# Patient Record
Sex: MALE | Race: WHITE | Employment: FULL TIME | URBAN - METROPOLITAN AREA
[De-identification: names, ages, dates, MRNs, and addresses within clinical notes are randomized per-mention and may not be internally consistent; named-entity substitution may affect disease eponyms.]

---

## 2019-04-26 ENCOUNTER — OFFICE VISIT (OUTPATIENT)
Dept: FAMILY MEDICINE CLINIC | Age: 34
End: 2019-04-26
Payer: COMMERCIAL

## 2019-04-26 ENCOUNTER — HOSPITAL ENCOUNTER (OUTPATIENT)
Age: 34
Setting detail: SPECIMEN
Discharge: HOME | End: 2019-04-26
Payer: COMMERCIAL

## 2019-04-26 VITALS
SYSTOLIC BLOOD PRESSURE: 120 MMHG | BODY MASS INDEX: 26.68 KG/M2 | WEIGHT: 197 LBS | HEART RATE: 72 BPM | DIASTOLIC BLOOD PRESSURE: 76 MMHG | TEMPERATURE: 98.5 F | HEIGHT: 72 IN | RESPIRATION RATE: 14 BRPM

## 2019-04-26 DIAGNOSIS — Z13.6 SCREENING FOR CARDIOVASCULAR CONDITION: ICD-10-CM

## 2019-04-26 DIAGNOSIS — Z13.220 SCREENING FOR LIPID DISORDERS: ICD-10-CM

## 2019-04-26 DIAGNOSIS — Z76.89 ENCOUNTER TO ESTABLISH CARE: ICD-10-CM

## 2019-04-26 DIAGNOSIS — Z00.00 PHYSICAL EXAM, ANNUAL: Primary | ICD-10-CM

## 2019-04-26 DIAGNOSIS — G89.29 CHRONIC PAIN OF RIGHT THUMB: ICD-10-CM

## 2019-04-26 DIAGNOSIS — M79.644 CHRONIC PAIN OF RIGHT THUMB: ICD-10-CM

## 2019-04-26 DIAGNOSIS — Z00.00 PHYSICAL EXAM, ANNUAL: ICD-10-CM

## 2019-04-26 DIAGNOSIS — R20.0 THUMB NUMBNESS: ICD-10-CM

## 2019-04-26 LAB
ALBUMIN SERPL-MCNC: 4.6 G/DL (ref 3.5–5.2)
ALBUMIN/GLOBULIN RATIO: 1.7 (ref 1–2.5)
ALP BLD-CCNC: 67 U/L (ref 40–129)
ALT SERPL-CCNC: 39 U/L (ref 5–41)
ANION GAP SERPL CALCULATED.3IONS-SCNC: 18 MMOL/L (ref 9–17)
AST SERPL-CCNC: 40 U/L
BILIRUB SERPL-MCNC: <0.1 MG/DL (ref 0.3–1.2)
BUN BLDV-MCNC: 19 MG/DL (ref 6–20)
BUN/CREAT BLD: ABNORMAL (ref 9–20)
CALCIUM SERPL-MCNC: 9.9 MG/DL (ref 8.6–10.4)
CHLORIDE BLD-SCNC: 108 MMOL/L (ref 98–107)
CHOLESTEROL/HDL RATIO: 3.7
CHOLESTEROL: 209 MG/DL
CO2: 21 MMOL/L (ref 20–31)
CREAT SERPL-MCNC: 0.97 MG/DL (ref 0.7–1.2)
GFR AFRICAN AMERICAN: >60 ML/MIN
GFR NON-AFRICAN AMERICAN: >60 ML/MIN
GFR SERPL CREATININE-BSD FRML MDRD: ABNORMAL ML/MIN/{1.73_M2}
GFR SERPL CREATININE-BSD FRML MDRD: ABNORMAL ML/MIN/{1.73_M2}
GLUCOSE BLD-MCNC: 71 MG/DL (ref 70–99)
HCT VFR BLD CALC: 47.3 % (ref 40.7–50.3)
HDLC SERPL-MCNC: 57 MG/DL
HEMOGLOBIN: 15.1 G/DL (ref 13–17)
LDL CHOLESTEROL: 121 MG/DL (ref 0–130)
MCH RBC QN AUTO: 30.6 PG (ref 25.2–33.5)
MCHC RBC AUTO-ENTMCNC: 31.9 G/DL (ref 28.4–34.8)
MCV RBC AUTO: 95.9 FL (ref 82.6–102.9)
NRBC AUTOMATED: 0 PER 100 WBC
PDW BLD-RTO: 12.5 % (ref 11.8–14.4)
PLATELET # BLD: 217 K/UL (ref 138–453)
PMV BLD AUTO: 10.9 FL (ref 8.1–13.5)
POTASSIUM SERPL-SCNC: 4.8 MMOL/L (ref 3.7–5.3)
RBC # BLD: 4.93 M/UL (ref 4.21–5.77)
SODIUM BLD-SCNC: 147 MMOL/L (ref 135–144)
TOTAL PROTEIN: 7.3 G/DL (ref 6.4–8.3)
TRIGL SERPL-MCNC: 155 MG/DL
VLDLC SERPL CALC-MCNC: ABNORMAL MG/DL (ref 1–30)
WBC # BLD: 6.4 K/UL (ref 3.5–11.3)

## 2019-04-26 PROCEDURE — 99385 PREV VISIT NEW AGE 18-39: CPT | Performed by: NURSE PRACTITIONER

## 2019-04-26 SDOH — HEALTH STABILITY: MENTAL HEALTH: HOW OFTEN DO YOU HAVE A DRINK CONTAINING ALCOHOL?: 2-3 TIMES A WEEK

## 2019-04-26 SDOH — HEALTH STABILITY: MENTAL HEALTH: HOW MANY STANDARD DRINKS CONTAINING ALCOHOL DO YOU HAVE ON A TYPICAL DAY?: 1 OR 2

## 2019-04-26 ASSESSMENT — ENCOUNTER SYMPTOMS
VOMITING: 0
RHINORRHEA: 0
BLOOD IN STOOL: 0
WHEEZING: 0
EYE REDNESS: 0
SORE THROAT: 0
EYE DISCHARGE: 0
DIARRHEA: 0
SHORTNESS OF BREATH: 0
BACK PAIN: 0
COUGH: 0
ABDOMINAL DISTENTION: 0
NAUSEA: 0
CHEST TIGHTNESS: 0
ABDOMINAL PAIN: 0
EYE PAIN: 0
SINUS PRESSURE: 0

## 2019-04-26 ASSESSMENT — PATIENT HEALTH QUESTIONNAIRE - PHQ9
SUM OF ALL RESPONSES TO PHQ9 QUESTIONS 1 & 2: 0
2. FEELING DOWN, DEPRESSED OR HOPELESS: 0
1. LITTLE INTEREST OR PLEASURE IN DOING THINGS: 0
SUM OF ALL RESPONSES TO PHQ QUESTIONS 1-9: 0
SUM OF ALL RESPONSES TO PHQ QUESTIONS 1-9: 0

## 2019-04-26 NOTE — PROGRESS NOTES
Subjective:      Patient ID: Sarah Barnes is a 27 y.o. male. Visit Information    Have you changed or started any medications since your last visit including any over-the-counter medicines, vitamins, or herbal medicines? no   Are you having any side effects from any of your medications? -  no  Have you stopped taking any of your medications? Is so, why? -  no    Have you seen any other physician or provider since your last visit? No  Have you had any other diagnostic tests since your last visit? No  Have you been seen in the emergency room and/or had an admission to a hospital since we last saw you? No  Have you had your routine dental cleaning in the past 6 months? yes - routine    Have you activated your [employer] account? If not, what are your barriers? Yes     Patient Care Team:  WILTON Monahan - CNP as PCP - General (Certified Nurse Practitioner)    Medical History Review  Past Medical, Family, and Social History reviewed and does contribute to the patient presenting condition    Health Maintenance   Topic Date Due    Pneumococcal 0-64 years Vaccine (1 of 1 - PPSV23) 02/16/1990    Varicella Vaccine (1 of 2 - 13+ 2-dose series) 02/16/1997    DTaP/Tdap/Td vaccine (1 - Tdap) 02/16/2003    HIV screen  04/26/2020 (Originally 2/16/1999)    Flu vaccine (Season Ended) 09/01/2019       PHQ Scores 4/26/2019   PHQ2 Score 0   PHQ9 Score 0     Interpretation of Total Score DepressionSeverity: 1-4 = Minimal depression, 5-9 = Mild depression, 10-14 = Moderate depression, 15-19 = Moderately severe depression, 20-27 = Severe depression    No current outpatient medications on file. No current facility-administered medications for this visit. Hand Pain    The incident occurred more than 1 week ago. There was no injury mechanism. Pain location: right thumb. The quality of the pain is described as stabbing. The pain does not radiate.  Pain scale: up to 8/10 with reaching for things - goes away when he stops. The patient is experiencing no pain. The pain has been intermittent since the incident. Associated symptoms include numbness. Pertinent negatives include no chest pain. Exacerbated by: reaching. He has tried nothing for the symptoms. Review of Systems   Constitutional: Negative for activity change, appetite change, fatigue and fever. HENT: Negative for congestion, ear discharge, ear pain, postnasal drip, rhinorrhea, sinus pressure and sore throat. Eyes: Negative for pain, discharge, redness and visual disturbance. Respiratory: Negative for cough, chest tightness, shortness of breath and wheezing. Cardiovascular: Negative for chest pain, palpitations and leg swelling. Gastrointestinal: Negative for abdominal distention, abdominal pain, blood in stool, diarrhea, nausea and vomiting. Umbilical hernia for last 2 years   Endocrine: Negative for polydipsia, polyphagia and polyuria. Genitourinary: Negative for decreased urine volume, dysuria, flank pain and hematuria. Musculoskeletal: Negative for arthralgias, back pain, joint swelling, myalgias and neck stiffness. Right thumb pain for last 4-6 weeks   Skin: Negative for rash. Neurological: Positive for numbness. Negative for dizziness, syncope, weakness, light-headedness and headaches. Hematological: Negative for adenopathy. Psychiatric/Behavioral: Negative for agitation, decreased concentration, self-injury, sleep disturbance and suicidal ideas. The patient is not nervous/anxious and is not hyperactive. Objective:     /76 (Site: Left Upper Arm, Position: Sitting, Cuff Size: Medium Adult)   Pulse 72   Temp 98.5 °F (36.9 °C) (Tympanic)   Resp 14   Ht 6' (1.829 m)   Wt 197 lb (89.4 kg)   BMI 26.72 kg/m²      Physical Exam   Constitutional: He is oriented to person, place, and time. Vital signs are normal. He appears well-developed and well-nourished. He is cooperative. No distress.    HENT:   Head: Atraumatic. Right Ear: Tympanic membrane, external ear and ear canal normal.   Left Ear: Tympanic membrane, external ear and ear canal normal.   Nose: Nose normal.   Mouth/Throat: Uvula is midline, oropharynx is clear and moist and mucous membranes are normal.   Eyes: Pupils are equal, round, and reactive to light. Conjunctivae and lids are normal. Right eye exhibits no discharge. Left eye exhibits no discharge. Neck: Trachea normal and normal range of motion. Neck supple. Cardiovascular: Normal rate, regular rhythm, S1 normal, S2 normal and normal heart sounds. No murmur heard. Pulses:       Radial pulses are 2+ on the right side, and 2+ on the left side. Posterior tibial pulses are 2+ on the right side, and 2+ on the left side. Pulmonary/Chest: Effort normal and breath sounds normal. No respiratory distress. He has no wheezes. He has no rhonchi. Abdominal: Soft. Bowel sounds are normal. He exhibits no distension. There is no hepatosplenomegaly. There is no tenderness. There is no rebound and no CVA tenderness. Musculoskeletal: Normal range of motion. He exhibits no edema or tenderness. Right hand: He exhibits normal range of motion, normal capillary refill and no deformity. Normal sensation noted. Normal strength noted. Hands:  Lymphadenopathy:     He has no cervical adenopathy. Neurological: He is alert and oriented to person, place, and time. He has normal strength. He exhibits normal muscle tone. Coordination normal.   Skin: Skin is warm, dry and intact. No rash noted. No erythema. Psychiatric: He has a normal mood and affect. His speech is normal and behavior is normal. Thought content normal.   Nursing note and vitals reviewed. Assessment:      Diagnosis Orders   1. Physical exam, annual  Lipid Panel    Comprehensive Metabolic Panel    CBC   2. Encounter to establish care     3. Screening for lipid disorders  Lipid Panel    Comprehensive Metabolic Panel    CBC   4. Screening for cardiovascular condition  Lipid Panel    Comprehensive Metabolic Panel    CBC   5. Chronic pain of right thumb  External Referral To Hand Surgery   6. Thumb numbness  External Referral To Hand Surgery       Plan:     Care established in office   Proceed with fasting today labs as ordered  Recommend healthy diet and regular exercise  Referral to hand surgeon   Recommend monthly self testicular exams and skin check  Recommend using sunscreen when outside, yearly eye exams, twice yearly dental exams, wear seatbelt when in car, and helmet when biking   Call with concerns  Return in about 1 year (around 4/26/2020) for physical.    Blaze Pope received counseling on the following healthy behaviors: nutrition, exercise, medication adherence and tobacco cessation  Reviewed prior labs and health maintenance  Continue current medications, diet and exercise. Discussed use, benefit, and side effects of prescribed medications. Barriers to medication compliance addressed. Patient given educational materials - see patient instructions  Was a self-tracking handout given in paper form or via [employer]? No    Requested Prescriptions      No prescriptions requested or ordered in this encounter       All patient questions answered. Patient voiced understanding. Quality Measures    Body mass index is 26.72 kg/m². Elevated. Weight control planned discussed Healthy diet and regular exercise. BP: 120/76 Blood pressure is normal. Treatment plan consists of No treatment change needed.     Lab Results   Component Value Date    LDLCHOLESTEROL 121 04/26/2019    (goal LDL reduction with dx if diabetes is 50% LDL reduction)      PHQ Scores 4/26/2019   PHQ2 Score 0   PHQ9 Score 0     Interpretation of Total Score Depression Severity: 1-4 = Minimal depression, 5-9 = Mild depression, 10-14 = Moderate depression, 15-19 = Moderately severe depression, 20-27 = Severe depression            Electronically signed by Don Silva

## 2019-04-26 NOTE — PATIENT INSTRUCTIONS
condoms. For men  · Tests for sexually transmitted infections (STIs). Ask whether you should have tests for STIs. You may be at risk if you have sex with more than one person, especially if you do not wear a condom. · Testicular cancer exam. Ask your doctor whether you should check your testicles regularly. · Prostate exam. Talk to your doctor about whether you should have a blood test (called a PSA test) for prostate cancer. Experts differ on whether and when men should have this test. Some experts suggest it if you are older than 39 and are -American or have a father or brother who got prostate cancer when he was younger than 72. When should you call for help? Watch closely for changes in your health, and be sure to contact your doctor if you have any problems or symptoms that concern you. Where can you learn more? Go to https://BollingoBlogpeyennieb.[email or web address].com. org and sign in to your [employer] account. Enter P072 in the Reebee box to learn more about \"Well Visit, Ages 25 to 48: Care Instructions. \"     If you do not have an account, please click on the \"Sign Up Now\" link. Current as of: March 28, 2018  Content Version: 11.9  © 3053-7520 [employer]. Care instructions adapted under license by [facility] ([facility]). If you have questions about a medical condition or this instruction, always ask your healthcare professional. [address]  any warranty or liability for your use of this information.

## 2020-06-10 ENCOUNTER — OFFICE VISIT (OUTPATIENT)
Dept: FAMILY MEDICINE CLINIC | Age: 35
End: 2020-06-10
Payer: COMMERCIAL

## 2020-06-10 VITALS
OXYGEN SATURATION: 97 % | HEART RATE: 69 BPM | TEMPERATURE: 96.4 F | DIASTOLIC BLOOD PRESSURE: 68 MMHG | BODY MASS INDEX: 26.95 KG/M2 | SYSTOLIC BLOOD PRESSURE: 110 MMHG | HEIGHT: 72 IN | WEIGHT: 199 LBS | RESPIRATION RATE: 14 BRPM

## 2020-06-10 PROCEDURE — 99213 OFFICE O/P EST LOW 20 MIN: CPT | Performed by: NURSE PRACTITIONER

## 2020-06-10 SDOH — ECONOMIC STABILITY: TRANSPORTATION INSECURITY
IN THE PAST 12 MONTHS, HAS LACK OF TRANSPORTATION KEPT YOU FROM MEETINGS, WORK, OR FROM GETTING THINGS NEEDED FOR DAILY LIVING?: NO

## 2020-06-10 SDOH — ECONOMIC STABILITY: INCOME INSECURITY: HOW HARD IS IT FOR YOU TO PAY FOR THE VERY BASICS LIKE FOOD, HOUSING, MEDICAL CARE, AND HEATING?: NOT HARD AT ALL

## 2020-06-10 SDOH — ECONOMIC STABILITY: TRANSPORTATION INSECURITY
IN THE PAST 12 MONTHS, HAS THE LACK OF TRANSPORTATION KEPT YOU FROM MEDICAL APPOINTMENTS OR FROM GETTING MEDICATIONS?: NO

## 2020-06-10 SDOH — ECONOMIC STABILITY: FOOD INSECURITY: WITHIN THE PAST 12 MONTHS, THE FOOD YOU BOUGHT JUST DIDN'T LAST AND YOU DIDN'T HAVE MONEY TO GET MORE.: NEVER TRUE

## 2020-06-10 SDOH — ECONOMIC STABILITY: FOOD INSECURITY: WITHIN THE PAST 12 MONTHS, YOU WORRIED THAT YOUR FOOD WOULD RUN OUT BEFORE YOU GOT MONEY TO BUY MORE.: NEVER TRUE

## 2020-06-10 ASSESSMENT — PATIENT HEALTH QUESTIONNAIRE - PHQ9
SUM OF ALL RESPONSES TO PHQ QUESTIONS 1-9: 0
1. LITTLE INTEREST OR PLEASURE IN DOING THINGS: 0
SUM OF ALL RESPONSES TO PHQ9 QUESTIONS 1 & 2: 0
SUM OF ALL RESPONSES TO PHQ QUESTIONS 1-9: 0
2. FEELING DOWN, DEPRESSED OR HOPELESS: 0

## 2020-06-10 ASSESSMENT — ENCOUNTER SYMPTOMS
DIARRHEA: 0
SORE THROAT: 0
SHORTNESS OF BREATH: 0
CONSTIPATION: 0
COUGH: 0
RHINORRHEA: 0

## 2020-06-17 ENCOUNTER — HOSPITAL ENCOUNTER (OUTPATIENT)
Dept: ULTRASOUND IMAGING | Facility: CLINIC | Age: 35
Discharge: HOME | End: 2020-06-19
Payer: COMMERCIAL

## 2020-06-17 PROCEDURE — 76705 ECHO EXAM OF ABDOMEN: CPT

## 2020-07-22 ENCOUNTER — HOSPITAL ENCOUNTER (OUTPATIENT)
Dept: SURGERY | Age: 35
Discharge: HOME | End: 2020-07-22
Payer: COMMERCIAL

## 2020-07-22 VITALS
SYSTOLIC BLOOD PRESSURE: 106 MMHG | HEART RATE: 88 BPM | HEIGHT: 72 IN | DIASTOLIC BLOOD PRESSURE: 71 MMHG | OXYGEN SATURATION: 97 % | WEIGHT: 207 LBS | BODY MASS INDEX: 28.04 KG/M2

## 2020-07-22 PROBLEM — K42.9 UMBILICAL HERNIA WITHOUT OBSTRUCTION AND WITHOUT GANGRENE: Status: ACTIVE | Noted: 2020-07-22

## 2020-07-22 PROCEDURE — 99201 HC NEW PT, OUTPT VISIT LEVEL 1: CPT

## 2020-07-22 PROCEDURE — 99203 OFFICE O/P NEW LOW 30 MIN: CPT | Performed by: SURGERY

## 2020-07-22 ASSESSMENT — ENCOUNTER SYMPTOMS
BACK PAIN: 0
SORE THROAT: 0
NAUSEA: 0
WHEEZING: 0
ABDOMINAL DISTENTION: 0
COUGH: 0
RHINORRHEA: 0
ABDOMINAL PAIN: 1
SHORTNESS OF BREATH: 0
CONSTIPATION: 0
DIARRHEA: 0

## 2020-07-22 NOTE — PROGRESS NOTES
rhonchi, or wheezes. CARDIO: S1 and S2 are within normal limits, regular rate and rhythm, no murmurs, no jugular venous distention and no ankle edema. ABDOMEN: the abdomen is soft without organomegaly, masses or tenderness. There are no abdominal scars. Within the supraumbilical skin fold and umbilical hernia is obvious on both supine and upright exam with an approximately 2 to 3 cm hernia bulge. When he is supine this is mostly reducible and is slightly tender when reduced. The underlying fascial defect does appear to only be about 1 cm or so in size. No other midline hernias can be appreciated even with upright examination and provocative maneuvers. INGUINAL: With inguinal areas are examined upright and with provocative maneuvers and no evidence of inguinal hernia can be appreciated on either side. GENITOURINARY: Normal circumcised male. No testicular masses or tenderness are present. RECTAL: ANA not preformed  EXTREMITIES: no deformity or edema noted. Imaging: The outpatient ultrasound performed by Jackson Contreras was reviewed, both report and images and this is summarized as above. ASSESSMENT:     Assessment:  Active Hospital Problems    Diagnosis Date Noted    Umbilical hernia without obstruction and without gangrene [K42.9] 07/22/2020     1. Umbilical hernia. This has enlarged slightly recently and become symptomatic. I have recommended repair. I illustrated a preperitoneal mesh approach and discussed indications as well as the risks with him at some length. Consent form is been discussed and signed. He is agreeable to proceed with a good understanding of indications and risks. 2.  Otherwise healthy 34-year-old male. PLAN:     1. Repair umbilical hernia. Outpatient, general anesthesia. 2.  The patient was counseled at length about the risks of tyson Covid-19 during their perioperative period and any recovery window from their procedure.   The patient was made aware that tyson Covid-19  may worsen their prognosis for recovering from their procedure  and lend to a higher morbidity and/or mortality risk. All material risks, benefits, and reasonable alternatives including postponing the procedure were discussed. The patient does wish to proceed with the procedure at this time. Follow-up: Repair umbilical hernia, outpatient, general anesthesia. Electronically signed by Georgina Epstein MD    This note was created with the assistance of a speech-recognition program.  Although the intention is to generate a document that actually reflects the content of the visit, no guarantees can be provided that every mistake has been identified and corrected by editing.

## 2020-08-27 ENCOUNTER — HOSPITAL ENCOUNTER (OUTPATIENT)
Dept: PREADMISSION TESTING | Age: 35
Discharge: HOME | End: 2020-08-31
Payer: COMMERCIAL

## 2020-08-27 ENCOUNTER — HOSPITAL ENCOUNTER (OUTPATIENT)
Dept: PREADMISSION TESTING | Age: 35
Setting detail: SPECIMEN
Discharge: HOME | End: 2020-08-31
Payer: COMMERCIAL

## 2020-08-27 VITALS
WEIGHT: 202.82 LBS | DIASTOLIC BLOOD PRESSURE: 61 MMHG | OXYGEN SATURATION: 99 % | RESPIRATION RATE: 16 BRPM | BODY MASS INDEX: 27.47 KG/M2 | HEART RATE: 66 BPM | SYSTOLIC BLOOD PRESSURE: 114 MMHG | HEIGHT: 72 IN | TEMPERATURE: 97.1 F

## 2020-08-27 PROCEDURE — U0003 INFECTIOUS AGENT DETECTION BY NUCLEIC ACID (DNA OR RNA); SEVERE ACUTE RESPIRATORY SYNDROME CORONAVIRUS 2 (SARS-COV-2) (CORONAVIRUS DISEASE [COVID-19]), AMPLIFIED PROBE TECHNIQUE, MAKING USE OF HIGH THROUGHPUT TECHNOLOGIES AS DESCRIBED BY CMS-2020-01-R: HCPCS

## 2020-08-27 RX ORDER — CEFAZOLIN SODIUM 2 G/50ML
2 SOLUTION INTRAVENOUS
Status: DISCONTINUED | OUTPATIENT
Start: 2020-08-27 | End: 2020-08-27

## 2020-08-27 NOTE — H&P
and nausea. Genitourinary: Negative for difficulty urinating, frequency, hematuria and urgency. Musculoskeletal: Negative for arthralgias, back pain and myalgias. Neurological: Negative for tremors and weakness. Hematological: Negative for adenopathy. Does not bruise/bleed easily.         Past Medical History:    Past Medical History   History reviewed. No pertinent past medical history.        Past Surgical History:    Past Surgical History   History reviewed. No pertinent surgical history.        Family History:   Family History         Family History   Problem Relation Age of Onset    Cancer Mother           ? ovarian    Heart Disease Father      Stroke Father 48    No Known Problems Sister      No Known Problems Brother      No Known Problems Maternal Grandmother      No Known Problems Maternal Grandfather      No Known Problems Paternal Grandmother      No Known Problems Paternal Grandfather             Social History:   Social History            Tobacco Use    Smoking status: Former Smoker       Types: Cigars       Last attempt to quit: 2019       Years since quittin.5    Smokeless tobacco: Never Used    Tobacco comment: 2019 - 1 cigar per month   Substance Use Topics    Alcohol use: Yes       Frequency: 2-3 times a week       Drinks per session: 1 or 2       Binge frequency: Never       Comment: socially        Medications:   Current Medication   No current outpatient medications on file.        Allergies:    Patient has no known allergies.     OBJECTIVE:      Vitals:    /71   Pulse 88   Ht 6' (1.829 m)   Wt 207 lb (93.9 kg)   SpO2 97%   BMI 28.07 kg/m²  Body mass index is 28.07 kg/m².     Physical Exam:    GENERAL APPEARANCE: awake, alert, very muscular and lean 38y.o. year old male, well-oriented, and in no acute distress  ENT: sclerae are clear and white without icterus, oropharynx shows patient has his own teeth with good dental hygiene, no erythema or masses. NECK: Neck is free of lymphadenopathy or thyroid masses. LUNGS: clear, equal breath sounds bilaterally without rales, rhonchi, or wheezes. CARDIO: S1 and S2 are within normal limits, regular rate and rhythm, no murmurs, no jugular venous distention and no ankle edema. ABDOMEN: the abdomen is soft without organomegaly, masses or tenderness. There are no abdominal scars. Within the supraumbilical skin fold and umbilical hernia is obvious on both supine and upright exam with an approximately 2 to 3 cm hernia bulge. When he is supine this is mostly reducible and is slightly tender when reduced. The underlying fascial defect does appear to only be about 1 cm or so in size. No other midline hernias can be appreciated even with upright examination and provocative maneuvers. INGUINAL: With inguinal areas are examined upright and with provocative maneuvers and no evidence of inguinal hernia can be appreciated on either side. GENITOURINARY: Normal circumcised male. No testicular masses or tenderness are present. RECTAL: ANA not preformed  EXTREMITIES: no deformity or edema noted.     Imaging: The outpatient ultrasound performed by Mary Graham was reviewed, both report and images and this is summarized as above.     ASSESSMENT:      Assessment:       Active Hospital Problems     Diagnosis Date Noted    Umbilical hernia without obstruction and without gangrene [K42.9] 07/22/2020     1. Umbilical hernia. This has enlarged slightly recently and become symptomatic. I have recommended repair. I illustrated a preperitoneal mesh approach and discussed indications as well as the risks with him at some length. Consent form is been discussed and signed. He is agreeable to proceed with a good understanding of indications and risks.     2.  Otherwise healthy 58-year-old male.     PLAN:      1.  Repair umbilical hernia. Outpatient, general anesthesia.     2.   The patient was counseled at length about the risks of tyson Covid-19 during their perioperative period and any recovery window from their procedure.  The patient was made aware that tyson Covid-19  may worsen their prognosis for recovering from their procedure  and lend to a higher morbidity and/or mortality risk.  All material risks, benefits, and reasonable alternatives including postponing the procedure were discussed. The patient does wish to proceed with the procedure at this time.        Follow-up: Repair umbilical hernia, outpatient, general anesthesia.     Electronically signed by Samantha Louie MD     This note was created with the assistance of a speech-recognition program.  Although the intention is to generate a document that actually reflects the content of the visit, no guarantees can be provided that every mistake has been identified and corrected by editing.

## 2020-08-27 NOTE — PRE-PROCEDURE INSTRUCTIONS
hair and body thoroughly to remove the shampoo.  Wash your face and genital area (private parts) with your regular soap or water only. Thoroughly rinse your body with warm water from the neck down.  Turn water off to prevent rinsing the soap off too soon.  With a clean wet washcloth and half of the CHG soap in the bottle, lather your entire body from the neck down. Do not use CHG soap near your eyes or ears to avoid injury to those areas.  Wash thoroughly, paying special attention to the area where your surgery will be performed.  Wash your body gently for five (5) minutes. Avoid scrubbing your skin too hard.  Turn the water back on and rinse your body thoroughly.  Pat yourself dry with a clean, soft towel. Do not apply lotion, cream or powder.  Dress with clean freshly washed clothes. The morning of surgery:     Repeat shower following steps above - using remaining half of CHG soap in bottle. Patient Instructions:    Eyvonne Clonts If you are having any type of anesthesia you are to have nothing to eat or drink after midnight the night before your surgery. This includes gum, hard candy, mints, water or smoking or chewing tobacco.  The only exception to this is a small sip of water to take with any morning dose of heart, blood pressure, or seizure medications. No alcoholic beverages for 24 hours prior to surgery.  Brush your teeth but do not swallow water.  Bring your eyeglasses and case with you. No contacts are to be worn the day of surgery. You also may bring your hearing aids. Most surgical procedures involving anesthesia will require that you remove your dentures prior to surgery. · Do not wear any jewelry or body piercings day of surgery. Also, NO lotion, perfume or deodorant to be used the day of surgery.   No nail polish on the operative extremity (arm/leg surgeries)    · If you are staying overnight with us, please bring a small bag of necessary personal items.     Please wear loose, comfortable clothing. If you are potentially going to have a cast or brace bring clothing that will fit over them.  In case of illness - If you have cold or flu like symptoms (high fever, runny nose, sore throat, cough, etc.) rash, nausea, vomiting, loose stools, and/or recent contact with someone who has a contagious disease (chicken pox, measles, etc.) Please call your doctor before coming to the hospital.         Day of Surgery/Procedure:    As a patient at [facility] - INPATIENT you can expect quality medical and nursing care that is centered on your individual needs. Our goal is to make your surgical experience as comfortable as possible    . Transportation After Your Surgery/Procedure: You will need a friend or family member to drive you home after your procedure. Your  must be 25years of age or older and able to sign off on your discharge instructions. A taxi cab or any other form of public transportation is not acceptable. Your friend or family member must stay at the hospital throughout your procedure. Someone must remain with you for the first 24 hours after your surgery if you receive anesthesia or medication. If you do not have someone to stay with you, your procedure may be cancelled.       If you have any other questions regarding your procedure or the day of surgery, please call [phone]      _________________________  ____________________________  Signature (Patient)              Signature (Provider) & date

## 2020-08-29 LAB — SARS-COV-2, NAA: NOT DETECTED

## 2020-08-30 ENCOUNTER — TELEPHONE (OUTPATIENT)
Dept: PRIMARY CARE CLINIC | Age: 35
End: 2020-08-30

## 2020-08-31 ENCOUNTER — HOSPITAL ENCOUNTER (OUTPATIENT)
Age: 35
Setting detail: OUTPATIENT SURGERY
Discharge: HOME | End: 2020-08-31
Attending: SURGERY | Admitting: SURGERY
Payer: COMMERCIAL

## 2020-08-31 ENCOUNTER — ANESTHESIA EVENT (OUTPATIENT)
Dept: OPERATING ROOM | Age: 35
End: 2020-08-31
Payer: COMMERCIAL

## 2020-08-31 ENCOUNTER — ANESTHESIA (OUTPATIENT)
Dept: OPERATING ROOM | Age: 35
End: 2020-08-31
Payer: COMMERCIAL

## 2020-08-31 VITALS — SYSTOLIC BLOOD PRESSURE: 127 MMHG | TEMPERATURE: 86.9 F | DIASTOLIC BLOOD PRESSURE: 70 MMHG | OXYGEN SATURATION: 99 %

## 2020-08-31 VITALS
DIASTOLIC BLOOD PRESSURE: 92 MMHG | TEMPERATURE: 97.3 F | OXYGEN SATURATION: 99 % | SYSTOLIC BLOOD PRESSURE: 123 MMHG | HEIGHT: 72 IN | RESPIRATION RATE: 15 BRPM | BODY MASS INDEX: 27.47 KG/M2 | HEART RATE: 49 BPM | WEIGHT: 202.82 LBS

## 2020-08-31 PROBLEM — K42.9 UMBILICAL HERNIA WITHOUT OBSTRUCTION AND WITHOUT GANGRENE: Status: RESOLVED | Noted: 2020-07-22 | Resolved: 2020-08-31

## 2020-08-31 PROCEDURE — 6360000002 HC RX W HCPCS: Performed by: SURGERY

## 2020-08-31 PROCEDURE — 2580000003 HC RX 258: Performed by: SURGERY

## 2020-08-31 PROCEDURE — 7100000000 HC PACU RECOVERY - FIRST 15 MIN: Performed by: SURGERY

## 2020-08-31 PROCEDURE — 2500000003 HC RX 250 WO HCPCS: Performed by: NURSE ANESTHETIST, CERTIFIED REGISTERED

## 2020-08-31 PROCEDURE — 6360000002 HC RX W HCPCS: Performed by: NURSE ANESTHETIST, CERTIFIED REGISTERED

## 2020-08-31 PROCEDURE — 7100000001 HC PACU RECOVERY - ADDTL 15 MIN: Performed by: SURGERY

## 2020-08-31 PROCEDURE — 3700000001 HC ADD 15 MINUTES (ANESTHESIA): Performed by: SURGERY

## 2020-08-31 PROCEDURE — 3700000000 HC ANESTHESIA ATTENDED CARE: Performed by: SURGERY

## 2020-08-31 PROCEDURE — 2500000003 HC RX 250 WO HCPCS: Performed by: SURGERY

## 2020-08-31 PROCEDURE — 2580000003 HC RX 258: Performed by: ANESTHESIOLOGY

## 2020-08-31 PROCEDURE — 7100000011 HC PHASE II RECOVERY - ADDTL 15 MIN: Performed by: SURGERY

## 2020-08-31 PROCEDURE — 2709999900 HC NON-CHARGEABLE SUPPLY: Performed by: SURGERY

## 2020-08-31 PROCEDURE — C1781 MESH (IMPLANTABLE): HCPCS | Performed by: SURGERY

## 2020-08-31 PROCEDURE — 3600000002 HC SURGERY LEVEL 2 BASE: Performed by: SURGERY

## 2020-08-31 PROCEDURE — 3600000012 HC SURGERY LEVEL 2 ADDTL 15MIN: Performed by: SURGERY

## 2020-08-31 PROCEDURE — 7100000010 HC PHASE II RECOVERY - FIRST 15 MIN: Performed by: SURGERY

## 2020-08-31 PROCEDURE — 49585 REPAIR UMBILICAL HERN,5+Y/O,REDUC: CPT | Performed by: SURGERY

## 2020-08-31 DEVICE — MESH HERN W10XL15CM FLAT MACRO X3 STD DISPOSABLE PARIETENE: Type: IMPLANTABLE DEVICE | Site: UMBILICAL | Status: FUNCTIONAL

## 2020-08-31 RX ORDER — HYDROMORPHONE HCL 110MG/55ML
0.5 PATIENT CONTROLLED ANALGESIA SYRINGE INTRAVENOUS EVERY 5 MIN PRN
Status: DISCONTINUED | OUTPATIENT
Start: 2020-08-31 | End: 2020-08-31 | Stop reason: HOSPADM

## 2020-08-31 RX ORDER — OXYCODONE HYDROCHLORIDE AND ACETAMINOPHEN 5; 325 MG/1; MG/1
1 TABLET ORAL PRN
Status: DISCONTINUED | OUTPATIENT
Start: 2020-08-31 | End: 2020-08-31 | Stop reason: HOSPADM

## 2020-08-31 RX ORDER — BUPIVACAINE HYDROCHLORIDE AND EPINEPHRINE 5; 5 MG/ML; UG/ML
INJECTION, SOLUTION EPIDURAL; INTRACAUDAL; PERINEURAL PRN
Status: DISCONTINUED | OUTPATIENT
Start: 2020-08-31 | End: 2020-08-31 | Stop reason: ALTCHOICE

## 2020-08-31 RX ORDER — GLYCOPYRROLATE 1 MG/5 ML
SYRINGE (ML) INTRAVENOUS PRN
Status: DISCONTINUED | OUTPATIENT
Start: 2020-08-31 | End: 2020-08-31 | Stop reason: SDUPTHER

## 2020-08-31 RX ORDER — NEOSTIGMINE METHYLSULFATE 5 MG/5 ML
SYRINGE (ML) INTRAVENOUS PRN
Status: DISCONTINUED | OUTPATIENT
Start: 2020-08-31 | End: 2020-08-31 | Stop reason: SDUPTHER

## 2020-08-31 RX ORDER — PROPOFOL 10 MG/ML
INJECTION, EMULSION INTRAVENOUS PRN
Status: DISCONTINUED | OUTPATIENT
Start: 2020-08-31 | End: 2020-08-31 | Stop reason: SDUPTHER

## 2020-08-31 RX ORDER — SODIUM CHLORIDE 9 MG/ML
INJECTION, SOLUTION INTRAVENOUS CONTINUOUS
Status: DISCONTINUED | OUTPATIENT
Start: 2020-08-31 | End: 2020-08-31

## 2020-08-31 RX ORDER — PHENYLEPHRINE HCL IN 0.9% NACL 1 MG/10 ML
SYRINGE (ML) INTRAVENOUS PRN
Status: DISCONTINUED | OUTPATIENT
Start: 2020-08-31 | End: 2020-08-31 | Stop reason: SDUPTHER

## 2020-08-31 RX ORDER — SODIUM CHLORIDE, SODIUM LACTATE, POTASSIUM CHLORIDE, CALCIUM CHLORIDE 600; 310; 30; 20 MG/100ML; MG/100ML; MG/100ML; MG/100ML
INJECTION, SOLUTION INTRAVENOUS CONTINUOUS
Status: DISCONTINUED | OUTPATIENT
Start: 2020-08-31 | End: 2020-08-31 | Stop reason: HOSPADM

## 2020-08-31 RX ORDER — SODIUM CHLORIDE 0.9 % (FLUSH) 0.9 %
10 SYRINGE (ML) INJECTION EVERY 12 HOURS SCHEDULED
Status: DISCONTINUED | OUTPATIENT
Start: 2020-08-31 | End: 2020-08-31 | Stop reason: HOSPADM

## 2020-08-31 RX ORDER — ONDANSETRON 2 MG/ML
4 INJECTION INTRAMUSCULAR; INTRAVENOUS
Status: DISCONTINUED | OUTPATIENT
Start: 2020-08-31 | End: 2020-08-31 | Stop reason: HOSPADM

## 2020-08-31 RX ORDER — LIDOCAINE HYDROCHLORIDE 10 MG/ML
1 INJECTION, SOLUTION EPIDURAL; INFILTRATION; INTRACAUDAL; PERINEURAL
Status: DISCONTINUED | OUTPATIENT
Start: 2020-08-31 | End: 2020-08-31 | Stop reason: HOSPADM

## 2020-08-31 RX ORDER — OXYCODONE HYDROCHLORIDE AND ACETAMINOPHEN 5; 325 MG/1; MG/1
1 TABLET ORAL EVERY 6 HOURS PRN
Qty: 8 TABLET | Refills: 0 | Status: SHIPPED | OUTPATIENT
Start: 2020-08-31 | End: 2020-09-03

## 2020-08-31 RX ORDER — ONDANSETRON 2 MG/ML
INJECTION INTRAMUSCULAR; INTRAVENOUS PRN
Status: DISCONTINUED | OUTPATIENT
Start: 2020-08-31 | End: 2020-08-31 | Stop reason: SDUPTHER

## 2020-08-31 RX ORDER — FENTANYL CITRATE 50 UG/ML
INJECTION, SOLUTION INTRAMUSCULAR; INTRAVENOUS PRN
Status: DISCONTINUED | OUTPATIENT
Start: 2020-08-31 | End: 2020-08-31 | Stop reason: SDUPTHER

## 2020-08-31 RX ORDER — PROMETHAZINE HYDROCHLORIDE 25 MG/ML
6.25 INJECTION, SOLUTION INTRAMUSCULAR; INTRAVENOUS
Status: DISCONTINUED | OUTPATIENT
Start: 2020-08-31 | End: 2020-08-31 | Stop reason: HOSPADM

## 2020-08-31 RX ORDER — CEFAZOLIN SODIUM 2 G/50ML
2 SOLUTION INTRAVENOUS
Status: COMPLETED | OUTPATIENT
Start: 2020-08-31 | End: 2020-08-31

## 2020-08-31 RX ORDER — DEXAMETHASONE SODIUM PHOSPHATE 10 MG/ML
INJECTION, SOLUTION INTRAMUSCULAR; INTRAVENOUS PRN
Status: DISCONTINUED | OUTPATIENT
Start: 2020-08-31 | End: 2020-08-31 | Stop reason: SDUPTHER

## 2020-08-31 RX ORDER — KETOROLAC TROMETHAMINE 15 MG/ML
15 INJECTION, SOLUTION INTRAMUSCULAR; INTRAVENOUS ONCE
Status: COMPLETED | OUTPATIENT
Start: 2020-08-31 | End: 2020-08-31

## 2020-08-31 RX ORDER — SODIUM CHLORIDE 0.9 % (FLUSH) 0.9 %
10 SYRINGE (ML) INJECTION PRN
Status: DISCONTINUED | OUTPATIENT
Start: 2020-08-31 | End: 2020-08-31 | Stop reason: HOSPADM

## 2020-08-31 RX ORDER — ROCURONIUM BROMIDE 10 MG/ML
INJECTION, SOLUTION INTRAVENOUS PRN
Status: DISCONTINUED | OUTPATIENT
Start: 2020-08-31 | End: 2020-08-31 | Stop reason: SDUPTHER

## 2020-08-31 RX ORDER — HYDRALAZINE HYDROCHLORIDE 20 MG/ML
5 INJECTION INTRAMUSCULAR; INTRAVENOUS EVERY 10 MIN PRN
Status: DISCONTINUED | OUTPATIENT
Start: 2020-08-31 | End: 2020-08-31 | Stop reason: HOSPADM

## 2020-08-31 RX ORDER — OXYCODONE HYDROCHLORIDE AND ACETAMINOPHEN 5; 325 MG/1; MG/1
2 TABLET ORAL PRN
Status: DISCONTINUED | OUTPATIENT
Start: 2020-08-31 | End: 2020-08-31 | Stop reason: HOSPADM

## 2020-08-31 RX ORDER — MIDAZOLAM HYDROCHLORIDE 1 MG/ML
INJECTION INTRAMUSCULAR; INTRAVENOUS PRN
Status: DISCONTINUED | OUTPATIENT
Start: 2020-08-31 | End: 2020-08-31 | Stop reason: SDUPTHER

## 2020-08-31 RX ORDER — FENTANYL CITRATE 50 UG/ML
25 INJECTION, SOLUTION INTRAMUSCULAR; INTRAVENOUS EVERY 5 MIN PRN
Status: DISCONTINUED | OUTPATIENT
Start: 2020-08-31 | End: 2020-08-31 | Stop reason: HOSPADM

## 2020-08-31 RX ORDER — LABETALOL HYDROCHLORIDE 5 MG/ML
5 INJECTION, SOLUTION INTRAVENOUS EVERY 10 MIN PRN
Status: DISCONTINUED | OUTPATIENT
Start: 2020-08-31 | End: 2020-08-31 | Stop reason: HOSPADM

## 2020-08-31 RX ORDER — LIDOCAINE HYDROCHLORIDE 20 MG/ML
INJECTION, SOLUTION EPIDURAL; INFILTRATION; INTRACAUDAL; PERINEURAL PRN
Status: DISCONTINUED | OUTPATIENT
Start: 2020-08-31 | End: 2020-08-31 | Stop reason: SDUPTHER

## 2020-08-31 RX ADMIN — Medication 2 MG: at 08:44

## 2020-08-31 RX ADMIN — KETOROLAC TROMETHAMINE 15 MG: 15 INJECTION, SOLUTION INTRAMUSCULAR; INTRAVENOUS at 09:39

## 2020-08-31 RX ADMIN — FENTANYL CITRATE 50 MCG: 50 INJECTION, SOLUTION INTRAMUSCULAR; INTRAVENOUS at 07:38

## 2020-08-31 RX ADMIN — Medication 0.4 MG: at 08:44

## 2020-08-31 RX ADMIN — LIDOCAINE HYDROCHLORIDE 100 MG: 20 INJECTION, SOLUTION EPIDURAL; INFILTRATION; INTRACAUDAL; PERINEURAL at 07:26

## 2020-08-31 RX ADMIN — ROCURONIUM BROMIDE 40 MG: 10 INJECTION, SOLUTION INTRAVENOUS at 07:26

## 2020-08-31 RX ADMIN — Medication 50 MCG: at 08:29

## 2020-08-31 RX ADMIN — FENTANYL CITRATE 100 MCG: 50 INJECTION, SOLUTION INTRAMUSCULAR; INTRAVENOUS at 07:26

## 2020-08-31 RX ADMIN — MIDAZOLAM 2 MG: 1 INJECTION INTRAMUSCULAR; INTRAVENOUS at 07:21

## 2020-08-31 RX ADMIN — CEFAZOLIN SODIUM 2 G: 2 SOLUTION INTRAVENOUS at 07:36

## 2020-08-31 RX ADMIN — SODIUM CHLORIDE, POTASSIUM CHLORIDE, SODIUM LACTATE AND CALCIUM CHLORIDE: 600; 310; 30; 20 INJECTION, SOLUTION INTRAVENOUS at 06:41

## 2020-08-31 RX ADMIN — DEXAMETHASONE SODIUM PHOSPHATE 10 MG: 10 INJECTION INTRAMUSCULAR; INTRAVENOUS at 07:39

## 2020-08-31 RX ADMIN — ONDANSETRON 4 MG: 2 INJECTION, SOLUTION INTRAMUSCULAR; INTRAVENOUS at 08:26

## 2020-08-31 RX ADMIN — PROPOFOL 200 MG: 10 INJECTION, EMULSION INTRAVENOUS at 07:26

## 2020-08-31 ASSESSMENT — PULMONARY FUNCTION TESTS
PIF_VALUE: 16
PIF_VALUE: 15
PIF_VALUE: 18
PIF_VALUE: 2
PIF_VALUE: 18
PIF_VALUE: 19
PIF_VALUE: 18
PIF_VALUE: 15
PIF_VALUE: 18
PIF_VALUE: 1
PIF_VALUE: 27
PIF_VALUE: 17
PIF_VALUE: 0
PIF_VALUE: 19
PIF_VALUE: 18
PIF_VALUE: 19
PIF_VALUE: 18
PIF_VALUE: 4
PIF_VALUE: 18
PIF_VALUE: 3
PIF_VALUE: 18
PIF_VALUE: 18
PIF_VALUE: 16
PIF_VALUE: 18
PIF_VALUE: 17
PIF_VALUE: 16
PIF_VALUE: 1
PIF_VALUE: 18
PIF_VALUE: 18
PIF_VALUE: 3
PIF_VALUE: 18
PIF_VALUE: 18
PIF_VALUE: 16
PIF_VALUE: 18
PIF_VALUE: 18
PIF_VALUE: 1
PIF_VALUE: 1
PIF_VALUE: 14
PIF_VALUE: 17
PIF_VALUE: 18
PIF_VALUE: 19
PIF_VALUE: 19
PIF_VALUE: 16
PIF_VALUE: 18
PIF_VALUE: 18
PIF_VALUE: 20
PIF_VALUE: 18
PIF_VALUE: 19
PIF_VALUE: 1
PIF_VALUE: 18
PIF_VALUE: 36
PIF_VALUE: 18
PIF_VALUE: 18
PIF_VALUE: 36
PIF_VALUE: 19
PIF_VALUE: 18
PIF_VALUE: 18
PIF_VALUE: 1
PIF_VALUE: 19
PIF_VALUE: 15
PIF_VALUE: 18
PIF_VALUE: 17
PIF_VALUE: 17
PIF_VALUE: 18
PIF_VALUE: 20
PIF_VALUE: 18
PIF_VALUE: 2
PIF_VALUE: 18
PIF_VALUE: 19

## 2020-08-31 ASSESSMENT — PAIN SCALES - GENERAL
PAINLEVEL_OUTOF10: 0

## 2020-08-31 ASSESSMENT — PAIN - FUNCTIONAL ASSESSMENT: PAIN_FUNCTIONAL_ASSESSMENT: 0-10

## 2020-08-31 NOTE — INTERVAL H&P NOTE
Update History & Physical    The patient's History and Physical of August 27, 2020 was reviewed with the patient and I examined the patient. There was no change. The surgical site was confirmed by the patient and me. Plan: The risks, benefits, expected outcome, and alternative to the recommended procedure have been discussed with the patient. Patient understands and wants to proceed with the procedure.      Electronically signed by Daria Gonsalves MD on 8/31/2020 at 7:15 AM

## 2020-08-31 NOTE — ANESTHESIA POSTPROCEDURE EVALUATION
Department of Anesthesiology  Postprocedure Note    Patient: Elayne Daniel  MRN: [id number]  YOB: 1985  Date of evaluation: 8/31/2020  Time:  11:43 AM     Procedure Summary     Date:  08/31/20 Room / Location:  [address] [place] [address]    Anesthesia Start:  2953 Anesthesia Stop:  9358    Procedure: HERNIA UMBILICAL REPAIR WITH MESH (N/A Abdomen) Diagnosis:  (DX UMB HERNIA)    Surgeon:  Rupa Wolff MD Responsible Provider:  Rand Shone, DO    Anesthesia Type:  general ASA Status:  2          Anesthesia Type: No value filed. Audra Phase I: Audra Score: 9    Audra Phase II: Audra Score: 10    Last vitals: Reviewed and per EMR flowsheets.        Anesthesia Post Evaluation    Patient location during evaluation: PACU  Patient participation: complete - patient participated  Level of consciousness: awake and alert  Airway patency: patent  Nausea & Vomiting: no nausea and no vomiting  Complications: no  Cardiovascular status: hemodynamically stable  Respiratory status: acceptable  Hydration status: stable

## 2020-08-31 NOTE — OP NOTE
[address] [address]  RECORD OF OPERATION    PATIENT NAME: [address] [place] [address] RECORD NO. [id number]  DATE: 8/31/2020  SURGEON: Tiff Varela MD, F.A.C.S. PRIMARY CARE PHYSICIAN: WILTON Keenan - DAMIAN     PREOPERATIVE DIAGNOSIS:  Umbilical hernia. POSTOPERATIVE DIAGNOSIS:  Umbilical hernia. PROCEDURE PERFORMED:  Umbilical hernia repair with pre-peritoneal mesh  SURGEON: Tiff Varela MD.  ANESTHESIA:  General   ESTIMATED BLOOD LOSS:  10 ml  REPLACED: 900 ml LR  SPECIMEN:  None. COMPLICATIONS:  None. INDICATIONS & CONSENT: Rafael Melendez is a 38y.o.-year-old male who presents with an umbilical hernia that has been present for about 1 year. This was asymptomatic until early July when he started to notice pain during his weightlifting workouts. He noticed that if he would push on the umbilicus he could relieve his pain but pain would recur with bending over or straining or lifting again. .  Repair was recommended to him and a preperitoneal mesh approach was described. .  The patient understands the indications and risks of the procedure and agrees. OPERATIVE FINDINGS: At the time of exploration, the patient had a 3 x 1.5 cm defect in the fascia. A pre-peritoneal mesh repair was performed as described below utilizing a 10 x 7.5 cm piece of Parietene mesh. .     DETAILS OF THE PROCEDURE:  With the patient supine on the operating table and under general anesthesia the abdomen was prepped and draped in the usual sterile fashion. The patient received Ancef 2 g IV as single dose IV antibiotic wound prophylaxis. EPC cuffs were placed just prior to the induction of anesthesia and utilized throughout for DVT prophylaxis. A skin incision was made transversely along the supraumbilical skin fold and carried down through the full thickness of the dermis and into the subcutaneous tissue until the hernia sac could be identified.   It was found that there were 2 other small defects inferior to the main hernia sac in the midline. One was directly underneath the umbilical skin fold. By sharp and blunt dissection the sac was freed from the overlying dermis of the umbilical skin fold and surrounding subcutaneous tissue until the abdominal wall fascia could be visualized circumferentially around the base of the sac.all 3 of these defects could be visualized. The fascial bridges between the more divided. Hemostasis was maintained with electrocautery. The 2 smaller sacs just had preperitoneal fat coming through them and this was excised. The hernia sac was opened. It was found to be devoid of contents. Excess sac was excised and the peritoneum was then closed with running locking 3-0 PDS. The excised sac was discarded. . The defect was 3 x 1.5 in size. Once the hernia sac had been excised and closed then the fascial margins were grasped with Allis clamps and the pre-peritoneal space was dissected sharply and bluntly. A space was created extending as far as dissection could be accomplished in all directions beyond the fascial edges of the defect. This was longer in the midline than laterally. The preperitoneal space was measured and was found to be 10 cm in length and 8 cm in width. A 10 x 7.5 cm size piece of Parietene mesh was cut out of a larger 10 x 15 cm piece and placed into the pre-peritoneal space. The mesh was anchored superiorly, inferiorly, and laterally with mattress sutures of 2-0 Prolene. The wound and mesh was irrigated with saline solution containing gentamycin. The fascia was then closed over the mesh in a vertical direction using a running over-lapping suture of 2-0 prolene. The fascia was injected with 0.5% marcaine with epinephrine to aid in post-operative analgesia. The umbilical skin fold was recreated with a figure of eight suture of 3-0 monocryl and the subcutaneous dead space was closed also with interrupted 3-0 monocryl.  The skin was closed with a combination

## 2020-08-31 NOTE — ANESTHESIA PRE PROCEDURE
Department of Anesthesiology  Preprocedure Note       Name:  Elayne Daniel   Age:  38 y.o.  :  1985                                          MRN:  [id number]         Date:  2020      Surgeon: Vida Capps):  Rupa Wolff MD    Procedure: Procedure(s):   HERNIA UMBILICAL REPAIR WITH MESH    Medications prior to admission:   Prior to Admission medications    Not on File       Current medications:    Current Facility-Administered Medications   Medication Dose Route Frequency Provider Last Rate Last Dose    lactated ringers infusion   Intravenous Continuous Ndal Ruth Quinones  mL/hr at 20 0641      sodium chloride flush 0.9 % injection 10 mL  10 mL Intravenous 2 times per day Viji Pratt MD        sodium chloride flush 0.9 % injection 10 mL  10 mL Intravenous PRN Viji Pratt MD        lidocaine PF 1 % injection 1 mL  1 mL Intradermal Once PRN Viji Pratt MD         Facility-Administered Medications Ordered in Other Encounters   Medication Dose Route Frequency Provider Last Rate Last Dose    midazolam (VERSED) injection    PRN Patricio Milder, APRN - CRNA   2 mg at 20 0170    fentaNYL (SUBLIMAZE) injection    PRN Patricio Milder, APRN - CRNA   50 mcg at 20 6103    rocuronium (ZEMURON) injection    PRN Patricio Milder, APRN - CRNA   40 mg at 20 0726    lidocaine PF 2 % injection    PRN Patricio Milder, APRN - CRNA   100 mg at 20    propofol injection    PRN Patricio Milder, APRN - CRNA   200 mg at 20 6976    dexamethasone (PF) (DECADRON) injection    PRN Patricio Milder, APRN - CRNA   10 mg at 20 5890       Allergies:  No Known Allergies    Problem List:    Patient Active Problem List   Diagnosis Code    Umbilical hernia without obstruction and without gangrene K42.9       Past Medical History:        Diagnosis Date    Hernia, umbilical        Past Surgical History:        Procedure Laterality Date    NASAL SINUS SURGERY Social History:    Social History     Tobacco Use    Smoking status: Former Smoker     Types: Cigars     Last attempt to quit: 2019     Years since quittin.6    Smokeless tobacco: Never Used    Tobacco comment: 2019 - 1 cigar per month   Substance Use Topics    Alcohol use: Yes     Frequency: 2-3 times a week     Drinks per session: 1 or 2     Binge frequency: Never     Comment: socially                                Counseling given: Not Answered  Comment: 2019 - 1 cigar per month      Vital Signs (Current):   Vitals:    20 0620   BP: 124/73   Pulse: 50   Resp: 20   Temp: 97.5 °F (36.4 °C)   TempSrc: Oral   SpO2: 99%   Weight: 202 lb 13.2 oz (92 kg)   Height: 6' (1.829 m)                                              BP Readings from Last 3 Encounters:   20 124/73   20 (!) 88/51   20 114/61       NPO Status: Time of last liquid consumption:                         Time of last solid consumption:                         Date of last liquid consumption: 20                        Date of last solid food consumption: 20    BMI:   Wt Readings from Last 3 Encounters:   20 202 lb 13.2 oz (92 kg)   20 202 lb 13.2 oz (92 kg)   20 207 lb (93.9 kg)     Body mass index is 27.51 kg/m².     CBC:   Lab Results   Component Value Date    WBC 6.4 2019    RBC 4.93 2019    HGB 15.1 2019    HCT 47.3 2019    MCV 95.9 2019    RDW 12.5 2019     2019       CMP:   Lab Results   Component Value Date     2019    K 4.8 2019     2019    CO2 21 2019    BUN 19 2019    CREATININE 0.97 2019    GFRAA >60 2019    LABGLOM >60 2019    GLUCOSE 71 2019    PROT 7.3 2019    CALCIUM 9.9 2019    BILITOT <0.10 2019    ALKPHOS 67 2019    AST 40 2019    ALT 39 2019       POC Tests: No results for input(s): POCGLU, POCNA, POCK, POCCL, Lazaro Sandovaloka, POCHCT in the last 72 hours. Coags: No results found for: PROTIME, INR, APTT    HCG (If Applicable): No results found for: PREGTESTUR, PREGSERUM, HCG, HCGQUANT     ABGs: No results found for: PHART, PO2ART, EFC2IGR, RRP2SAB, BEART, G2SZCVSM     Type & Screen (If Applicable):  No results found for: LABABO, LABRH    Drug/Infectious Status (If Applicable):  No results found for: HIV, HEPCAB    COVID-19 Screening (If Applicable):   Lab Results   Component Value Date    COVID19 Not Detected 08/27/2020         Anesthesia Evaluation  Patient summary reviewed and Nursing notes reviewed no history of anesthetic complications:   Airway: Mallampati: II        Dental: normal exam         Pulmonary:normal exam        (-) COPD and asthma                           Cardiovascular:  Exercise tolerance: no interval change,       (-) hypertension, past MI and CAD        Rate: normal                    Neuro/Psych:      (-) seizures, TIA and CVA           GI/Hepatic/Renal:        (-) GERD       Endo/Other:        (-) diabetes mellitus               Abdominal:           Vascular:                                        Anesthesia Plan      general     ASA 2       Induction: intravenous. Anesthetic plan and risks discussed with patient. Plan discussed with CRNA.     Attending anesthesiologist reviewed and agrees with Pre Eval content              Christina Sow DO   8/31/2020

## 2020-09-08 ENCOUNTER — HOSPITAL ENCOUNTER (OUTPATIENT)
Dept: SURGERY | Age: 35
Discharge: HOME | End: 2020-09-08
Payer: COMMERCIAL

## 2020-09-08 VITALS
DIASTOLIC BLOOD PRESSURE: 62 MMHG | HEIGHT: 72 IN | WEIGHT: 199 LBS | HEART RATE: 66 BPM | BODY MASS INDEX: 26.95 KG/M2 | OXYGEN SATURATION: 98 % | SYSTOLIC BLOOD PRESSURE: 104 MMHG

## 2020-09-08 PROBLEM — Z09 POSTOPERATIVE FOLLOW-UP: Status: ACTIVE | Noted: 2020-09-08

## 2020-09-08 PROCEDURE — 99211 OFF/OP EST MAY X REQ PHY/QHP: CPT

## 2020-09-08 PROCEDURE — 99024 POSTOP FOLLOW-UP VISIT: CPT | Performed by: SURGERY

## 2020-09-08 NOTE — PROGRESS NOTES
[address] [place] [address]  Post-op Hernia Note    PATIENT NAME: Holland Leigh   MRN NUMBER: [id number]  YOB: 1985  PHONE NUMBER: [phone]  PRIMARY CARE PHYSICIAN: WILTON Hernandez CNP  TODAY'S DATE: 9/8/2020    SUBJECTIVE:     Chief Complaint: First postoperative follow-up after umbilical hernia repair. History of Present Illness[de-identified] Jose Rafael García is 8 days postoperative following repair of an umbilical hernia. He had a 3 x 1.5 cm defect. A preperitoneal mesh repair was performed with a 10 x 7.5 cm preperitoneal mesh reinforcement using Parietene mesh. He describes an above average comfort level. He states he never did use any of the prescription pain pills. Today he states that he really is not noticing any discomfort at all even with bending or twisting. He reports no wound problems. He has been complying with his 10 pound lifting restriction. Date of Surgery: August 31, 2020    Past Medical History:    Past Medical History:   Diagnosis Date    Hernia, umbilical        Past Surgical History:    Past Surgical History:   Procedure Laterality Date    HERNIA REPAIR  78/66/3028    Umbilical hernia repair with mesh    NASAL SINUS SURGERY      UMBILICAL HERNIA REPAIR N/A 7/15/2872    HERNIA UMBILICAL REPAIR WITH MESH performed by Rudi Montilla MD at [facility] OR       Medications:   No current outpatient medications on file. Allergies:    Patient has no known allergies. OBJECTIVE:     Vitals:    /62   Pulse 66   Ht 6' (1.829 m)   Wt 199 lb (90.3 kg)   SpO2 98%   BMI 26.99 kg/m²  Body mass index is 26.99 kg/m². Physical Exam:    GENERAL APPEARANCE: awake, alert, lean and muscular 38y.o. year old male, well-oriented, and in no acute distress  LUNGS: clear, equal breath sounds bilaterally without rales, rhonchi, or wheezes. ABDOMEN: The abdomen is examined upright. He has an excellent contour to the abdominal wall. There is no ecchymosis or swelling.   The wound is healing very nicely and the repair is intact. ASSESSMENT:     Assessment:  1. Excellent status from the standpoint of his comfort level and wound healing. There are no signs of any wound complications. 2.  The repair is intact. PLAN:     1. He is to continue his 10 pound lifting restriction for two more weeks. However he may do lower body exercises according to his comfort level. 2.  I discussed his activity at work with him in some detail. I have given him the okay to return to work as of September 14 with the exception of climbing vertical ladders. He may resume climbing vertical ladders one week after his initial return to work (September 21). Follow-up: 3 weeks. Electronically signed by Maricel Bradshaw MD    This note was created with the assistance of a speech-recognition program.  Although the intention is to generate a document that actually reflects the content of the visit, no guarantees can be provided that every mistake has been identified and corrected by editing.

## 2020-10-05 NOTE — PROGRESS NOTES
umbilical hernia. There are no wound complications. The repair is intact. Comfort and functional levels are excellent. PLAN:     1. He may continue to have activity as tolerated by his comfort level. I discussed with him resumption of weight lifting and other activities at the gym in some detail. 2.  Routine hygiene measures will suffice for wound care. Follow-up: He may follow-up with me now on a as needed basis but he will have long-term outcomes follow-up through the [facility]. Electronically signed by Romy Monteiro MD    This note was created with the assistance of a speech-recognition program.  Although the intention is to generate a document that actually reflects the content of the visit, no guarantees can be provided that every mistake has been identified and corrected by editing.

## 2020-10-06 ENCOUNTER — HOSPITAL ENCOUNTER (OUTPATIENT)
Dept: SURGERY | Age: 35
Discharge: HOME | End: 2020-10-06
Payer: COMMERCIAL

## 2020-10-06 VITALS
HEIGHT: 72 IN | WEIGHT: 201 LBS | SYSTOLIC BLOOD PRESSURE: 121 MMHG | HEART RATE: 80 BPM | DIASTOLIC BLOOD PRESSURE: 86 MMHG | OXYGEN SATURATION: 100 % | BODY MASS INDEX: 27.22 KG/M2

## 2020-10-06 PROCEDURE — 99024 POSTOP FOLLOW-UP VISIT: CPT | Performed by: SURGERY

## 2020-10-06 PROCEDURE — 99211 OFF/OP EST MAY X REQ PHY/QHP: CPT

## 2020-10-20 ENCOUNTER — OFFICE VISIT (OUTPATIENT)
Dept: FAMILY MEDICINE CLINIC | Age: 35
End: 2020-10-20
Payer: COMMERCIAL

## 2020-10-20 VITALS
HEIGHT: 72 IN | HEART RATE: 72 BPM | OXYGEN SATURATION: 96 % | TEMPERATURE: 98.7 F | SYSTOLIC BLOOD PRESSURE: 114 MMHG | BODY MASS INDEX: 26.82 KG/M2 | WEIGHT: 198 LBS | DIASTOLIC BLOOD PRESSURE: 72 MMHG | RESPIRATION RATE: 15 BRPM

## 2020-10-20 PROCEDURE — G8484 FLU IMMUNIZE NO ADMIN: HCPCS | Performed by: NURSE PRACTITIONER

## 2020-10-20 PROCEDURE — 99395 PREV VISIT EST AGE 18-39: CPT | Performed by: NURSE PRACTITIONER

## 2020-10-20 SDOH — SOCIAL STABILITY: SOCIAL NETWORK: HOW OFTEN DO YOU ATTEND CHURCH OR RELIGIOUS SERVICES?: NEVER

## 2020-10-20 SDOH — SOCIAL STABILITY: SOCIAL INSECURITY
WITHIN THE LAST YEAR, HAVE YOU BEEN KICKED, HIT, SLAPPED, OR OTHERWISE PHYSICALLY HURT BY YOUR PARTNER OR EX-PARTNER?: NO

## 2020-10-20 SDOH — SOCIAL STABILITY: SOCIAL NETWORK
DO YOU BELONG TO ANY CLUBS OR ORGANIZATIONS SUCH AS CHURCH GROUPS UNIONS, FRATERNAL OR ATHLETIC GROUPS, OR SCHOOL GROUPS?: NO

## 2020-10-20 SDOH — HEALTH STABILITY: PHYSICAL HEALTH: ON AVERAGE, HOW MANY DAYS PER WEEK DO YOU ENGAGE IN MODERATE TO STRENUOUS EXERCISE (LIKE A BRISK WALK)?: 6 DAYS

## 2020-10-20 SDOH — HEALTH STABILITY: MENTAL HEALTH
STRESS IS WHEN SOMEONE FEELS TENSE, NERVOUS, ANXIOUS, OR CAN'T SLEEP AT NIGHT BECAUSE THEIR MIND IS TROUBLED. HOW STRESSED ARE YOU?: NOT AT ALL

## 2020-10-20 SDOH — SOCIAL STABILITY: SOCIAL NETWORK: HOW OFTEN DO YOU ATTENT MEETINGS OF THE CLUB OR ORGANIZATION YOU BELONG TO?: NEVER

## 2020-10-20 SDOH — SOCIAL STABILITY: SOCIAL INSECURITY: WITHIN THE LAST YEAR, HAVE YOU BEEN AFRAID OF YOUR PARTNER OR EX-PARTNER?: NO

## 2020-10-20 SDOH — SOCIAL STABILITY: SOCIAL NETWORK: IN A TYPICAL WEEK, HOW MANY TIMES DO YOU TALK ON THE PHONE WITH FAMILY, FRIENDS, OR NEIGHBORS?: ONCE A WEEK

## 2020-10-20 SDOH — HEALTH STABILITY: PHYSICAL HEALTH: ON AVERAGE, HOW MANY MINUTES DO YOU ENGAGE IN EXERCISE AT THIS LEVEL?: 60 MIN

## 2020-10-20 SDOH — SOCIAL STABILITY: SOCIAL NETWORK: ARE YOU MARRIED, WIDOWED, DIVORCED, SEPARATED, NEVER MARRIED, OR LIVING WITH A PARTNER?: MARRIED

## 2020-10-20 SDOH — SOCIAL STABILITY: SOCIAL INSECURITY: WITHIN THE LAST YEAR, HAVE YOU BEEN HUMILIATED OR EMOTIONALLY ABUSED IN OTHER WAYS BY YOUR PARTNER OR EX-PARTNER?: NO

## 2020-10-20 SDOH — SOCIAL STABILITY: SOCIAL NETWORK: HOW OFTEN DO YOU GET TOGETHER WITH FRIENDS OR RELATIVES?: ONCE A WEEK

## 2020-10-20 SDOH — SOCIAL STABILITY: SOCIAL INSECURITY
WITHIN THE LAST YEAR, HAVE TO BEEN RAPED OR FORCED TO HAVE ANY KIND OF SEXUAL ACTIVITY BY YOUR PARTNER OR EX-PARTNER?: NO

## 2020-10-20 ASSESSMENT — PATIENT HEALTH QUESTIONNAIRE - PHQ9
SUM OF ALL RESPONSES TO PHQ QUESTIONS 1-9: 0
2. FEELING DOWN, DEPRESSED OR HOPELESS: 0
SUM OF ALL RESPONSES TO PHQ9 QUESTIONS 1 & 2: 0
1. LITTLE INTEREST OR PLEASURE IN DOING THINGS: 0
SUM OF ALL RESPONSES TO PHQ QUESTIONS 1-9: 0
SUM OF ALL RESPONSES TO PHQ QUESTIONS 1-9: 0

## 2020-10-20 ASSESSMENT — ENCOUNTER SYMPTOMS
BACK PAIN: 0
ABDOMINAL PAIN: 0
COLOR CHANGE: 0
SHORTNESS OF BREATH: 0
APNEA: 0
SINUS PAIN: 0
ABDOMINAL DISTENTION: 0
CHEST TIGHTNESS: 0

## 2020-10-20 NOTE — PATIENT INSTRUCTIONS
It was my pleasure to meet with you today. Please contact me with any questions or concerns, and please notify myself or our manger if there is anyway we can improve our service in your health care needs. Below I have listed some instructions and information that pertain to today's visit.     -Heathy daily diet to include low salt and low carbohydrate, low sugar diabetic diet  -Drink 6-8 glasses of water daily  -Complete  fasting (8-12 hours - water, black coffee, plain tea ok) labs and/any other testing ordered prior to next scheduled followup

## 2020-10-20 NOTE — PROGRESS NOTES
[address] [facility]   [address]  [phone]    10/20/2020     Patient ID: Lashay Jacobs is a 38 y.o. male. CHIEF COMPLAINT:     Lashay Jacobs presents today for his medical conditions/complaints as noted below. Hellen Castro is c/o of Annual Exam  .    REVIEWED:      [x] Past Medical, Family, and Social History was reviewed per writer and does contribute to the patient presenting condition. [x] Laboratory Results, Vital signs, Imaging, Active Problems, Immunizations, Current/Recently Discontinued Medications, Health Maintenance Activities Due, Referral Notes (if available) were reviewed per writer     [x] Reviewed Depression screening if taken or valid today or any other valid screening tool (others seen below) Interpretation of Total Score DepressionSeverity: 1-4 = Minimal depression, 5-9 = Mild depression, 10-14 = Moderate depression, 15-19 = Moderately severe depression, 20-27 =Severe depression    PHQ Scores 10/20/2020 6/10/2020 4/26/2019   PHQ2 Score 0 0 0   PHQ9 Score 0 0 0       Interpretation of Total Score DepressionSeverity: 1-4 = Minimal depression, 5-9 = Mild depression, 10-14 = Moderate depression, 15-19 = Moderately severe depression, 20-27 = Severe depression    No Known Allergies  No current outpatient medications on file. No current facility-administered medications for this visit.       Past Medical History:   Diagnosis Date    Hernia, umbilical       Past Surgical History:   Procedure Laterality Date    HERNIA REPAIR  85/52/5737    Umbilical hernia repair with mesh    NASAL SINUS SURGERY      UMBILICAL HERNIA REPAIR N/A 2/50/6625    HERNIA UMBILICAL REPAIR WITH MESH performed by Malou Rosario MD at [address] [place] [address] History   Problem Relation Age of Onset    Cancer Mother         ? ovarian    Heart Disease Father     Stroke Father 48    No Known Problems Sister     No Known Problems Brother     No Known Problems Maternal Grandmother     No Known Problems Maternal Grandfather     No Known Problems Paternal Grandmother     No Known Problems Paternal Grandfather      Social History     Tobacco Use    Smoking status: Former Smoker     Types: Cigars     Last attempt to quit: 2019     Years since quittin.8    Smokeless tobacco: Never Used    Tobacco comment: 2019 - 1 cigar per month   Substance Use Topics    Alcohol use: Yes     Frequency: 2-3 times a week     Drinks per session: 1 or 2     Binge frequency: Never     Comment: socially            REVIEW OF SYSTEMS:     Review of Systems   Constitutional: Positive for fatigue. Negative for activity change and chills. HENT: Negative for ear pain and sinus pain. Respiratory: Negative for apnea, chest tightness and shortness of breath. Cardiovascular: Negative for chest pain and palpitations. Gastrointestinal: Negative for abdominal distention and abdominal pain. Genitourinary: Negative for difficulty urinating and dysuria. Musculoskeletal: Negative for arthralgias and back pain. Skin: Negative for color change. Neurological: Negative for dizziness. Psychiatric/Behavioral: Negative. HISTORY OF PRESENT ILLNESS     Patient presents for annual well visit. He states he works out 5-6 time daily for [employer]. He states he feels like he is constantly tired and irritable. He would like to have his testosterone checked while he is here. He does travel 3 weeks on and 3 weeks off for work. He works 12 hour shifts. Denies any trouble sleeping and states he sleeps 7-8 hours nightly. PHYSICAL EXAM:     /72 (Site: Left Upper Arm, Position: Sitting, Cuff Size: Large Adult)   Pulse 72   Temp 98.7 °F (37.1 °C) (Temporal)   Resp 15   Ht 6' (1.829 m)   Wt 198 lb (89.8 kg)   SpO2 96%   BMI 26.85 kg/m²    Physical Exam  Constitutional:       Appearance: Normal appearance. He is well-developed. He is not ill-appearing.    Eyes:      General: Right eye: No discharge. Left eye: No discharge. Extraocular Movements: Extraocular movements intact. Conjunctiva/sclera: Conjunctivae normal.   Neck:      Musculoskeletal: Normal range of motion. Thyroid: No thyroid mass. Vascular: No JVD. Cardiovascular:      Rate and Rhythm: Normal rate and regular rhythm. Pulmonary:      Effort: Pulmonary effort is normal. No respiratory distress. Abdominal:      General: Abdomen is flat. Palpations: Abdomen is soft. Musculoskeletal:      Right shoulder: He exhibits normal range of motion and no deformity. Left shoulder: He exhibits normal range of motion and no deformity. Skin:     General: Skin is warm and dry. Coloration: Skin is not cyanotic or jaundiced. Findings: No rash. Neurological:      General: No focal deficit present. Mental Status: He is alert and oriented to person, place, and time. Gait: Gait is intact. Psychiatric:         Attention and Perception: Attention normal. He does not perceive auditory or visual hallucinations. Mood and Affect: Mood normal.         Speech: Speech normal.          ASSESSMENT /PLAN     1. Annual physical exam    - Lipid Panel; Future  - CBC; Future  - Comprehensive Metabolic Panel, Fasting; Future  - TSH without Reflex; Future    2. Fatigue, unspecified type    - Testosterone; Future  - TSH without Reflex; Future  - T3; Future  - Thyroid Antibodies; Future    3. Encounter for vitamin deficiency screening    - Vitamin D 25 Hydroxy; Future    4. Screening for cardiovascular, respiratory, and genitourinary diseases    - Lipid Panel; Future  - CBC; Future  - Comprehensive Metabolic Panel, Fasting; Future      Return in about 6 months (around 4/20/2021) for recheck symptoms of today's primary complaint fatigue . COMMUNICATION:           The best way to find yourself is to lose yourself in the service of others - [facility].  [address] [facility] 419 [email or web address]  Office: [phone]   Cell: [phone]    Electronically signed by ABBIE Lynch on 11/8/2020 at 7:14 PM

## 2020-10-21 ENCOUNTER — HOSPITAL ENCOUNTER (OUTPATIENT)
Age: 35
Setting detail: SPECIMEN
Discharge: HOME | End: 2020-10-21
Payer: COMMERCIAL

## 2020-10-21 LAB
ALBUMIN SERPL-MCNC: 4.3 G/DL (ref 3.5–5.2)
ALBUMIN/GLOBULIN RATIO: 1.6 (ref 1–2.5)
ALP BLD-CCNC: 76 U/L (ref 40–129)
ALT SERPL-CCNC: 34 U/L (ref 5–41)
ANION GAP SERPL CALCULATED.3IONS-SCNC: 10 MMOL/L (ref 9–17)
AST SERPL-CCNC: 45 U/L
BILIRUB SERPL-MCNC: 0.54 MG/DL (ref 0.3–1.2)
BUN BLDV-MCNC: 17 MG/DL (ref 6–20)
BUN/CREAT BLD: ABNORMAL (ref 9–20)
CALCIUM SERPL-MCNC: 9.5 MG/DL (ref 8.6–10.4)
CHLORIDE BLD-SCNC: 104 MMOL/L (ref 98–107)
CHOLESTEROL/HDL RATIO: 3.8
CHOLESTEROL: 225 MG/DL
CO2: 26 MMOL/L (ref 20–31)
CREAT SERPL-MCNC: 1.11 MG/DL (ref 0.7–1.2)
GFR AFRICAN AMERICAN: >60 ML/MIN
GFR NON-AFRICAN AMERICAN: >60 ML/MIN
GFR SERPL CREATININE-BSD FRML MDRD: ABNORMAL ML/MIN/{1.73_M2}
GFR SERPL CREATININE-BSD FRML MDRD: ABNORMAL ML/MIN/{1.73_M2}
GLUCOSE FASTING: 99 MG/DL (ref 70–99)
HCT VFR BLD CALC: 45.8 % (ref 40.7–50.3)
HDLC SERPL-MCNC: 60 MG/DL
HEMOGLOBIN: 14.9 G/DL (ref 13–17)
LDL CHOLESTEROL: 145 MG/DL (ref 0–130)
MCH RBC QN AUTO: 30.6 PG (ref 25.2–33.5)
MCHC RBC AUTO-ENTMCNC: 32.5 G/DL (ref 28.4–34.8)
MCV RBC AUTO: 94 FL (ref 82.6–102.9)
NRBC AUTOMATED: 0 PER 100 WBC
PDW BLD-RTO: 12.9 % (ref 11.8–14.4)
PLATELET # BLD: 212 K/UL (ref 138–453)
PMV BLD AUTO: 11.1 FL (ref 8.1–13.5)
POTASSIUM SERPL-SCNC: 4.5 MMOL/L (ref 3.7–5.3)
RBC # BLD: 4.87 M/UL (ref 4.21–5.77)
SODIUM BLD-SCNC: 140 MMOL/L (ref 135–144)
T3 TOTAL: 85 NG/DL (ref 60–181)
TESTOSTERONE TOTAL: 620 NG/DL (ref 220–1000)
TOTAL PROTEIN: 7 G/DL (ref 6.4–8.3)
TRIGL SERPL-MCNC: 101 MG/DL
TSH SERPL DL<=0.05 MIU/L-ACNC: 2.11 MIU/L (ref 0.3–5)
VITAMIN D 25-HYDROXY: 36.4 NG/ML (ref 30–100)
VLDLC SERPL CALC-MCNC: ABNORMAL MG/DL (ref 1–30)
WBC # BLD: 4.7 K/UL (ref 3.5–11.3)

## 2020-10-22 LAB
THYROGLOBULIN AB: <20 IU/ML (ref 0–40)
THYROID PEROXIDASE (TPO) AB: 12.9 IU/ML (ref 0–35)

## 2020-11-04 PROBLEM — Z09 POSTOPERATIVE FOLLOW-UP: Status: RESOLVED | Noted: 2020-09-08 | Resolved: 2020-11-04

## 2021-04-28 ENCOUNTER — OFFICE VISIT (OUTPATIENT)
Dept: FAMILY MEDICINE CLINIC | Age: 36
End: 2021-04-28
Payer: COMMERCIAL

## 2021-04-28 ENCOUNTER — HOSPITAL ENCOUNTER (OUTPATIENT)
Age: 36
Setting detail: SPECIMEN
Discharge: HOME | End: 2021-04-28
Payer: COMMERCIAL

## 2021-04-28 VITALS
WEIGHT: 194 LBS | BODY MASS INDEX: 26.31 KG/M2 | HEART RATE: 71 BPM | SYSTOLIC BLOOD PRESSURE: 112 MMHG | OXYGEN SATURATION: 99 % | DIASTOLIC BLOOD PRESSURE: 74 MMHG | TEMPERATURE: 97.6 F

## 2021-04-28 DIAGNOSIS — E78.5 ELEVATED FASTING LIPID PROFILE: ICD-10-CM

## 2021-04-28 DIAGNOSIS — Z11.3 ROUTINE SCREENING FOR STI (SEXUALLY TRANSMITTED INFECTION): Primary | ICD-10-CM

## 2021-04-28 DIAGNOSIS — E55.9 VITAMIN D DEFICIENCY: ICD-10-CM

## 2021-04-28 DIAGNOSIS — Z11.59 NEED FOR HEPATITIS C SCREENING TEST: ICD-10-CM

## 2021-04-28 DIAGNOSIS — Z11.4 SCREENING FOR HIV (HUMAN IMMUNODEFICIENCY VIRUS): ICD-10-CM

## 2021-04-28 DIAGNOSIS — Z11.3 ROUTINE SCREENING FOR STI (SEXUALLY TRANSMITTED INFECTION): ICD-10-CM

## 2021-04-28 LAB
HEPATITIS C ANTIBODY: NONREACTIVE
HIV AG/AB: NONREACTIVE

## 2021-04-28 PROCEDURE — 99213 OFFICE O/P EST LOW 20 MIN: CPT | Performed by: NURSE PRACTITIONER

## 2021-04-28 PROCEDURE — G8419 CALC BMI OUT NRM PARAM NOF/U: HCPCS | Performed by: NURSE PRACTITIONER

## 2021-04-28 PROCEDURE — 1036F TOBACCO NON-USER: CPT | Performed by: NURSE PRACTITIONER

## 2021-04-28 PROCEDURE — G8427 DOCREV CUR MEDS BY ELIG CLIN: HCPCS | Performed by: NURSE PRACTITIONER

## 2021-04-28 ASSESSMENT — ENCOUNTER SYMPTOMS
CONSTIPATION: 0
RHINORRHEA: 0
SHORTNESS OF BREATH: 0
DIARRHEA: 0

## 2021-04-28 ASSESSMENT — PATIENT HEALTH QUESTIONNAIRE - PHQ9
SUM OF ALL RESPONSES TO PHQ QUESTIONS 1-9: 0
SUM OF ALL RESPONSES TO PHQ QUESTIONS 1-9: 0

## 2021-04-28 NOTE — PATIENT INSTRUCTIONS
It was my pleasure to meet with you today. Please contact me with any questions or concerns, and please notify myself or our manager if there is anyway we can improve our service in your health care needs. Below I have listed some instructions and information that pertain to today's visit.     -Heathy daily diet to include healthy balanced diet with good portions of lean meats and vegetables  -Drink 6-8 glasses of water daily  -Complete  non-fasting labs and/any other testing ordered prior to next scheduled followup

## 2021-04-28 NOTE — PROGRESS NOTES
[address] [facility]   [address]  [phone]    2021     CHIEF COMPLAINT:     Master Garza (:  1985) is a 39 y.o. male, here for evaluation of the following chief complaint(s):  Fatigue      REVIEWED INFORMATION      No Known Allergies    No current outpatient medications on file. No current facility-administered medications for this visit. Patient Care Team:  WILTON Rivera CNP as PCP - General (Nurse Practitioner)  WILTON Rivera CNP as PCP - [facility]    REVIEW OF SYSTEMS:     Review of Systems   Constitutional: Negative for activity change and unexpected weight change. HENT: Negative for ear pain, hearing loss and rhinorrhea. Respiratory: Negative for shortness of breath. Cardiovascular: Negative for palpitations and leg swelling. Gastrointestinal: Negative for constipation and diarrhea. Musculoskeletal: Negative for gait problem. Neurological: Negative for dizziness. Psychiatric/Behavioral: Negative for confusion. The patient is not nervous/anxious. HISTORY OF PRESENT ILLNESS     Patient presents today for 6-month follow-up. Denies any complaints at this time. Overall has been doing well. Reports that he has been back in the gym more frequently. Blood pressures well controlled with no issues today. Denies any chest pain, shortness of breath, or palpitations. Patient would like to be tested for STIs. Although he is asymptomatic he would like to verify that he is not been exposed. Reports relationship with his spouse is good. No safety concerns. Overall patient is doing well annual physical due in October patient will be given lab orders for testing as requested today. PHYSICAL EXAM:     /74   Pulse 71   Temp 97.6 °F (36.4 °C) (Temporal)   Wt 194 lb (88 kg)   SpO2 99%   BMI 26.31 kg/m²      Physical Exam  Vitals signs reviewed.    Constitutional: Appearance: Normal appearance. He is not ill-appearing. Cardiovascular:      Rate and Rhythm: Normal rate and regular rhythm. Heart sounds: No murmur. No friction rub. No gallop. Pulmonary:      Effort: Pulmonary effort is normal. No respiratory distress. Breath sounds: No wheezing. Abdominal:      General: Bowel sounds are normal.      Palpations: Abdomen is soft. Tenderness: There is no abdominal tenderness. Musculoskeletal:      Right lower leg: No edema. Left lower leg: No edema. Skin:     General: Skin is warm. Findings: No erythema, lesion or rash. Neurological:      Mental Status: He is alert. Psychiatric:         Mood and Affect: Mood normal.         Behavior: Behavior is cooperative. PROCEDURE/ IN OFFICE TESTING     No in office testing or procedures completed during today's office visit. ASSESSMENT/PLAN/ FOLLOWUP:     1. Routine screening for STI (sexually transmitted infection)  -     HIV Screen; Future  -     Herpes Profile; Future  -     Chlamydia/GC DNA, Urine; Future  2. Screening for HIV (human immunodeficiency virus)  -     HIV Screen; Future  3. Need for hepatitis C screening test  -     Hepatitis C Antibody; Future  4. Elevated fasting lipid profile  -     CBC; Future  -     Comprehensive Metabolic Panel, Fasting; Future  -     Lipid Panel; Future  5. Vitamin D deficiency  -     Vitamin D 25 Hydroxy; Future      Return in about 6 months (around 10/28/2021). COMMUNICATION:       On this date 4/28/2021 I have spent 20 minutes reviewing previous notes, test results and face to face with the patient discussing the diagnosis and importance of compliance with the treatment plan as well as documenting on the day of the visit. The best way to find yourself is to lose yourself in the service of others - [address] [place] [address]. [address] [place] [address]   [email or web address]  Office: [phone]     An electronic signature was used to authenticate this note.   Signed by ABBIE Sáncehz on 4/28/2021 at 8:57 AM

## 2021-04-29 LAB
C. TRACHOMATIS DNA ,URINE: NEGATIVE
HERPES SIMPLEX VIRUS 1 IGG: 0.32
HERPES SIMPLEX VIRUS 2 IGG: 0.11
HERPES TYPE 1/2 IGM COMBINED: 0.52
N. GONORRHOEAE DNA, URINE: NEGATIVE
SPECIMEN DESCRIPTION: NORMAL

## (undated) DEVICE — GARMENT,MEDLINE,DVT,INT,CALF,MED, GEN2: Brand: MEDLINE

## (undated) DEVICE — BLANKET WRM W29.9XL79.1IN UP BODY FORC AIR MISTRAL-AIR

## (undated) DEVICE — GLOVE SURG SZ 65 CRM LTX FREE POLYISOPRENE POLYMER BEAD ANTI

## (undated) DEVICE — YANKAUER,FLEXIBLE HANDLE,REGLR CAPACITY: Brand: MEDLINE INDUSTRIES, INC.

## (undated) DEVICE — GOWN,SIRUS,NON REINFRCD,LARGE,SET IN SL: Brand: MEDLINE

## (undated) DEVICE — TOWEL,OR,DSP,ST,BLUE,DLX,XR,4/PK,20PK/CS: Brand: MEDLINE

## (undated) DEVICE — Z DISCONTINUED USE 2624853 GLOVE SURG SZ 75 L12IN THK91MIL BRN LTX FREE

## (undated) DEVICE — ADHESIVE SKIN CLSR 0.7ML TOP DERMBND ADV

## (undated) DEVICE — HERNIA PK

## (undated) DEVICE — SKIN PREP TRAY W/CHG: Brand: MEDLINE INDUSTRIES, INC.